# Patient Record
Sex: FEMALE | NOT HISPANIC OR LATINO | Employment: FULL TIME | URBAN - NONMETROPOLITAN AREA
[De-identification: names, ages, dates, MRNs, and addresses within clinical notes are randomized per-mention and may not be internally consistent; named-entity substitution may affect disease eponyms.]

---

## 2022-05-24 ENCOUNTER — OFFICE VISIT (OUTPATIENT)
Dept: FAMILY MEDICINE CLINIC | Facility: CLINIC | Age: 22
End: 2022-05-24

## 2022-05-24 VITALS — SYSTOLIC BLOOD PRESSURE: 118 MMHG | DIASTOLIC BLOOD PRESSURE: 72 MMHG | HEART RATE: 76 BPM

## 2022-05-24 DIAGNOSIS — Z20.9: Primary | ICD-10-CM

## 2022-05-24 PROCEDURE — 99212 OFFICE O/P EST SF 10 MIN: CPT | Performed by: NURSE PRACTITIONER

## 2022-05-24 PROCEDURE — 36415 COLL VENOUS BLD VENIPUNCTURE: CPT | Performed by: NURSE PRACTITIONER

## 2022-05-24 NOTE — PROGRESS NOTES
Chief Complaint  Puncture Wound (DOI 5/23/22.  This is a work injury from [facility].  She comes in with a stick from a dirty bur in her R hand.  Is vaccinated against Hep B.)    Subjective          Jadyn Escalera presents to [facility]  History of Present Illness puncture wound of the right hand yesterday.  This is to the proximal right 1st finger.  She has no personal hx of HIV or hepatitis B or C.    Objective   Vital Signs:  /72 (BP Location: Left arm, Patient Position: Sitting, Cuff Size: Adult)   Pulse 76   BMI has not been calculated during today's encounter.       Physical Exam  Pulmonary:      Effort: Pulmonary effort is normal.   Skin:     General: Skin is warm and dry.      Comments: Puncture wound to the palm side of the right proximal index finger.  Slight red with no drainage.    Neurological:      Mental Status: She is alert and oriented to person, place, and time.        Result Review :                 Assessment and Plan    Diagnoses and all orders for this visit:    1. Contact w and exposure to unsp communicable disease (Primary)  Assessment & Plan:  She is to f/u in one month and in six months for bw.  She is to keep this site clean and dry. F/u for any signs and symptoms of infections. Back to work w/o restrictions.             Follow Up   Return in about 4 weeks (around 6/21/2022), or she will need repeat bw., for Recheck.  Patient was given instructions and counseling regarding her condition or for health maintenance advice. Please see specific information pulled into the AVS if appropriate.

## 2022-05-24 NOTE — ASSESSMENT & PLAN NOTE
She is to f/u in one month and in six months for bw.  She is to keep this site clean and dry. F/u for any signs and symptoms of infections. Back to work w/o restrictions.

## 2022-05-25 LAB
HAV IGM SERPL QL IA: NEGATIVE
HBV CORE IGM SERPL QL IA: NEGATIVE
HBV SURFACE AG SERPL QL IA: NEGATIVE
HCV AB S/CO SERPL IA: 0.1 S/CO RATIO (ref 0–0.9)
HCV AB SERPL QL IA: NORMAL
HIV 1+2 AB+HIV1 P24 AG SERPL QL IA: NON REACTIVE

## 2022-07-26 ENCOUNTER — OFFICE VISIT (OUTPATIENT)
Dept: FAMILY MEDICINE CLINIC | Facility: CLINIC | Age: 22
End: 2022-07-26

## 2022-07-26 VITALS
SYSTOLIC BLOOD PRESSURE: 134 MMHG | OXYGEN SATURATION: 99 % | DIASTOLIC BLOOD PRESSURE: 96 MMHG | BODY MASS INDEX: 29.28 KG/M2 | HEART RATE: 97 BPM | WEIGHT: 159.1 LBS | TEMPERATURE: 98.6 F | HEIGHT: 62 IN

## 2022-07-26 DIAGNOSIS — Z20.9: Primary | ICD-10-CM

## 2022-07-26 PROCEDURE — 99213 OFFICE O/P EST LOW 20 MIN: CPT | Performed by: NURSE PRACTITIONER

## 2022-07-26 PROCEDURE — 36415 COLL VENOUS BLD VENIPUNCTURE: CPT | Performed by: NURSE PRACTITIONER

## 2022-07-26 RX ORDER — MULTIPLE VITAMINS W/ MINERALS TAB 9MG-400MCG
1 TAB ORAL DAILY
COMMUNITY

## 2022-07-26 NOTE — PROGRESS NOTES
"Chief Complaint  Body Fluid Exposure (Needle stick f/u)    Subjective        Jadyn Escalera presents to [facility]  History of Present Illness she was stuck by a dirty needle last month while at work. She is here today for her second bw.She has not had any symptoms of disease.    Objective   Vital Signs:  /96 (BP Location: Left arm, Patient Position: Sitting, Cuff Size: Adult)   Pulse 97   Temp 98.6 °F (37 °C) (Temporal)   Ht 157.5 cm (62\")   Wt 72.2 kg (159 lb 1.6 oz)   SpO2 99%   BMI 29.10 kg/m²   Estimated body mass index is 29.1 kg/m² as calculated from the following:    Height as of this encounter: 157.5 cm (62\").    Weight as of this encounter: 72.2 kg (159 lb 1.6 oz).          Physical Exam  Vitals and nursing note reviewed.   Constitutional:       Appearance: Normal appearance.   HENT:      Head: Normocephalic and atraumatic.      Right Ear: Tympanic membrane and ear canal normal.      Left Ear: Tympanic membrane and ear canal normal.      Nose: Nose normal.      Mouth/Throat:      Pharynx: Oropharynx is clear.   Eyes:      Extraocular Movements: Extraocular movements intact.      Conjunctiva/sclera: Conjunctivae normal.      Pupils: Pupils are equal, round, and reactive to light.   Cardiovascular:      Rate and Rhythm: Normal rate and regular rhythm.      Heart sounds: Normal heart sounds.   Pulmonary:      Effort: Pulmonary effort is normal.      Breath sounds: Normal breath sounds.   Abdominal:      General: Abdomen is flat. Bowel sounds are normal. There is no distension.      Palpations: Abdomen is soft.      Tenderness: There is no abdominal tenderness. There is no guarding or rebound.   Musculoskeletal:         General: Normal range of motion.      Cervical back: Normal range of motion and neck supple.   Skin:     General: Skin is warm and dry.   Neurological:      General: No focal deficit present.      Mental Status: She is alert and oriented to " person, place, and time. Mental status is at baseline.   Psychiatric:         Mood and Affect: Mood normal.         Behavior: Behavior normal.         Thought Content: Thought content normal.         Judgment: Judgment normal.        Result Review :                Assessment and Plan   Diagnoses and all orders for this visit:    1. [facility] (Primary)  Assessment & Plan:  Follow up in 4 months for a recheck. Back to work without restrictions.              Follow Up   Return in about 4 months (around 11/26/2022) for Recheck.  Patient was given instructions and counseling regarding her condition or for health maintenance advice. Please see specific information pulled into the AVS if appropriate.

## 2022-07-26 NOTE — PROGRESS NOTES
"Chief Complaint  Body Fluid Exposure (Needle stick f/u)    Subjective        Jadyn Escalera presents to [facility]  History of Present Illness she has not had any illness or problems since her last visit.     Objective   Vital Signs:  /96 (BP Location: Left arm, Patient Position: Sitting, Cuff Size: Adult)   Pulse 97   Temp 98.6 °F (37 °C) (Temporal)   Ht 157.5 cm (62\")   Wt 72.2 kg (159 lb 1.6 oz)   SpO2 99%   BMI 29.10 kg/m²   Estimated body mass index is 29.1 kg/m² as calculated from the following:    Height as of this encounter: 157.5 cm (62\").    Weight as of this encounter: 72.2 kg (159 lb 1.6 oz).          Physical Exam  Constitutional:       Appearance: Normal appearance.   HENT:      Right Ear: Tympanic membrane normal.      Left Ear: Tympanic membrane normal.   Cardiovascular:      Rate and Rhythm: Normal rate and regular rhythm.   Pulmonary:      Effort: Pulmonary effort is normal.      Breath sounds: Normal breath sounds.   Skin:     General: Skin is warm and dry.   Neurological:      Mental Status: She is alert and oriented to person, place, and time.   Psychiatric:         Behavior: Behavior normal.        Result Review :                Assessment and Plan   Diagnoses and all orders for this visit:    1. Contact w and exposure to unsp communicable disease (Primary)  Assessment & Plan:  Follow up in 4 months for a recheck. Back to work without restrictions.              Follow Up   Return in about 4 months (around 11/26/2022) for Recheck.  Patient was given instructions and counseling regarding her condition or for health maintenance advice. Please see specific information pulled into the AVS if appropriate.       "

## 2022-11-22 ENCOUNTER — OFFICE VISIT (OUTPATIENT)
Dept: FAMILY MEDICINE CLINIC | Facility: CLINIC | Age: 22
End: 2022-11-22

## 2022-11-22 VITALS
HEART RATE: 88 BPM | HEIGHT: 63 IN | BODY MASS INDEX: 28.21 KG/M2 | DIASTOLIC BLOOD PRESSURE: 84 MMHG | TEMPERATURE: 98 F | WEIGHT: 159.2 LBS | OXYGEN SATURATION: 99 % | SYSTOLIC BLOOD PRESSURE: 138 MMHG

## 2022-11-22 DIAGNOSIS — R61 HYPERHIDROSIS: ICD-10-CM

## 2022-11-22 DIAGNOSIS — R03.0 ELEVATED BP WITHOUT DIAGNOSIS OF HYPERTENSION: ICD-10-CM

## 2022-11-22 DIAGNOSIS — R63.5 WEIGHT GAIN: ICD-10-CM

## 2022-11-22 DIAGNOSIS — F41.1 GAD (GENERALIZED ANXIETY DISORDER): Primary | ICD-10-CM

## 2022-11-22 PROCEDURE — 99214 OFFICE O/P EST MOD 30 MIN: CPT | Performed by: FAMILY MEDICINE

## 2022-11-22 RX ORDER — GLYCOPYRROLATE 2 MG/1
2 TABLET ORAL 2 TIMES DAILY
Qty: 60 TABLET | Refills: 2 | Status: SHIPPED | OUTPATIENT
Start: 2022-11-22 | End: 2022-12-13 | Stop reason: SDUPTHER

## 2022-11-22 RX ORDER — SERTRALINE HYDROCHLORIDE 100 MG/1
100 TABLET, FILM COATED ORAL DAILY
Qty: 90 TABLET | Refills: 1 | Status: SHIPPED | OUTPATIENT
Start: 2022-11-22 | End: 2023-03-07 | Stop reason: SDUPTHER

## 2022-11-22 NOTE — PROGRESS NOTES
Date: 2022   Patient Name: Jadyn Escalera  : 2000   MRN: [id number]     Chief Complaint:    Chief Complaint   Patient presents with   • Anxiety     Discuss medication options    • Thyroid Problem     Complains of weight gain, hormone changes        History of Present Illness: Jadyn Escalera is a 22 y.o. female who is here today to establish care.  Chronic conditions include DENZEL/MDD and Eczema.  She has been struggling with weight gain and difficulty losing weight.  She saw her gynecologist last year and thinks that her thyroid labs may have been slightly abnormal.  She has not had lab work since that time.  She does report hair loss as well but denies any family history of thyroid issues.    This patient is also wishing to discuss the options available to her regarding either increasing her current dose of Sertraline or exploring her options for trying a new medication for DENZEL/MDD control. While she feels that Sertraline works for her, she says that it worked much better when she first started taking it as compared to its efficacy now.  She struggles with symptoms of OCD which have also worsened recently.    She reports excessive sweating of her hands and feet and thinks that she may have hyperhidrosis.  She would like to discuss medication options.  She has tried topical preparations in the past without improvement.          Review of Systems:   Review of Systems   Constitutional: Positive for fatigue and unexpected weight gain. Negative for chills and fever.   Respiratory: Negative for cough and shortness of breath.    Cardiovascular: Negative for chest pain and palpitations.   Gastrointestinal: Negative for abdominal pain, constipation, diarrhea, nausea and vomiting.   Endocrine:        Excessive sweating, hair loss   Musculoskeletal: Negative for back pain and myalgias.   Neurological: Negative for dizziness and headache.   Psychiatric/Behavioral: Positive for sleep disturbance  "and stress. Negative for depressed mood. The patient is nervous/anxious.        Past Medical History:   Past Medical History:   Diagnosis Date   • Anxiety        Past Surgical History:   Past Surgical History:   Procedure Laterality Date   • WISDOM TOOTH EXTRACTION         Family History:   Family History   Problem Relation Age of Onset   • Hypertension Mother    • Hypertension Father    • Coronary artery disease Father    • Diabetes Maternal Grandmother    • Stroke Maternal Grandmother    • Lung cancer Other    • Breast cancer Other        Social History:   Social History     Socioeconomic History   • Marital status:    Tobacco Use   • Smoking status: Never   • Smokeless tobacco: Never   Vaping Use   • Vaping Use: Never used   Substance and Sexual Activity   • Alcohol use: Not Currently   • Drug use: Never   • Sexual activity: Yes     Partners: Male       Medications:     Current Outpatient Medications:   •  multivitamin with minerals tablet tablet, Take 1 tablet by mouth Daily., Disp: , Rfl:   •  glycopyrrolate (ROBINUL) 2 MG tablet, Take 1 tablet by mouth 2 (Two) Times a Day., Disp: 60 tablet, Rfl: 2  •  sertraline (Zoloft) 100 MG tablet, Take 1 tablet by mouth Daily., Disp: 90 tablet, Rfl: 1    Allergies:   Allergies   Allergen Reactions   • Morphine Shortness Of Breath   • Sulfa Antibiotics Hives       PHQ-2 Total Score: 0   PHQ-9 Total Score: 0       Physical Exam:  Vital Signs:   Vitals:    11/22/22 0827 11/22/22 0918   BP: 154/92 138/84   BP Location: Left arm Right arm   Patient Position: Sitting Sitting   Cuff Size: Adult Adult   Pulse: 88    Temp: 98 °F (36.7 °C)    TempSrc: Temporal    SpO2: 99%    Weight: 72.2 kg (159 lb 3.2 oz)    Height: 158.8 cm (62.5\")      Body mass index is 28.65 kg/m².     Physical Exam  Vitals and nursing note reviewed.   Constitutional:       Appearance: Normal appearance.   Cardiovascular:      Rate and Rhythm: Normal rate and regular rhythm.      Heart sounds: Normal " heart sounds. No murmur heard.  Pulmonary:      Effort: Pulmonary effort is normal.      Breath sounds: Normal breath sounds. No wheezing.   Abdominal:      General: Bowel sounds are normal.      Palpations: Abdomen is soft.   Skin:     General: Skin is warm.   Neurological:      Mental Status: She is alert and oriented to person, place, and time. Mental status is at baseline.   Psychiatric:         Mood and Affect: Mood normal.         Behavior: Behavior normal.           Assessment/Plan:   Diagnoses and all orders for this visit:    1. DENZEL (generalized anxiety disorder) (Primary)  Assessment & Plan:  Psychological condition is worsening.  Increase Zoloft to 100 mg daily  Psychological condition  will be reassessed In 3 weeks.    Orders:  -     sertraline (Zoloft) 100 MG tablet; Take 1 tablet by mouth Daily.  Dispense: 90 tablet; Refill: 1    2. Weight gain  Assessment & Plan:  Will obtain labs today to further assess and repeat thyroid levels    Orders:  -     CBC Auto Differential; Future  -     Comprehensive Metabolic Panel; Future  -     TSH; Future  -     T4, Free; Future  -     CBC Auto Differential  -     Comprehensive Metabolic Panel  -     TSH  -     T4, Free    3. Elevated BP without diagnosis of hypertension  Assessment & Plan:  Patient will keep ambulatory blood pressure log and bring it to her follow-up appointment      4. Hyperhidrosis  Assessment & Plan:  Rx Robinul 2 mg.  Patient will start with 1 tablet daily and may increase to 1 tablet twice daily.  Side effects discussed    Orders:  -     glycopyrrolate (ROBINUL) 2 MG tablet; Take 1 tablet by mouth 2 (Two) Times a Day.  Dispense: 60 tablet; Refill: 2         Follow Up:   Return in about 3 weeks (around 12/13/2022) for Med Recheck.    Candace Nicolas DO  [facility]

## 2022-11-22 NOTE — ASSESSMENT & PLAN NOTE
Psychological condition is worsening.  Increase Zoloft to 100 mg daily  Psychological condition  will be reassessed In 3 weeks.

## 2022-11-22 NOTE — ASSESSMENT & PLAN NOTE
Rx Robinul 2 mg.  Patient will start with 1 tablet daily and may increase to 1 tablet twice daily.  Side effects discussed

## 2022-11-23 LAB
ALBUMIN SERPL-MCNC: 4.8 G/DL (ref 3.9–5)
ALBUMIN/GLOB SERPL: 1.7 {RATIO} (ref 1.2–2.2)
ALP SERPL-CCNC: 81 IU/L (ref 44–121)
ALT SERPL-CCNC: 11 IU/L (ref 0–32)
AST SERPL-CCNC: 15 IU/L (ref 0–40)
BASOPHILS # BLD AUTO: 0.1 X10E3/UL (ref 0–0.2)
BASOPHILS NFR BLD AUTO: 1 %
BILIRUB SERPL-MCNC: 0.6 MG/DL (ref 0–1.2)
BUN SERPL-MCNC: 9 MG/DL (ref 6–20)
BUN/CREAT SERPL: 14 (ref 9–23)
CALCIUM SERPL-MCNC: 9.4 MG/DL (ref 8.7–10.2)
CHLORIDE SERPL-SCNC: 102 MMOL/L (ref 96–106)
CO2 SERPL-SCNC: 24 MMOL/L (ref 20–29)
CREAT SERPL-MCNC: 0.63 MG/DL (ref 0.57–1)
EGFRCR SERPLBLD CKD-EPI 2021: 127 ML/MIN/1.73
EOSINOPHIL # BLD AUTO: 0.1 X10E3/UL (ref 0–0.4)
EOSINOPHIL NFR BLD AUTO: 2 %
ERYTHROCYTE [DISTWIDTH] IN BLOOD BY AUTOMATED COUNT: 13.3 % (ref 11.7–15.4)
GLOBULIN SER CALC-MCNC: 2.9 G/DL (ref 1.5–4.5)
GLUCOSE SERPL-MCNC: 91 MG/DL (ref 70–99)
HCT VFR BLD AUTO: 37.5 % (ref 34–46.6)
HGB BLD-MCNC: 11.9 G/DL (ref 11.1–15.9)
IMM GRANULOCYTES # BLD AUTO: 0 X10E3/UL (ref 0–0.1)
IMM GRANULOCYTES NFR BLD AUTO: 1 %
LYMPHOCYTES # BLD AUTO: 2.8 X10E3/UL (ref 0.7–3.1)
LYMPHOCYTES NFR BLD AUTO: 39 %
MCH RBC QN AUTO: 27.1 PG (ref 26.6–33)
MCHC RBC AUTO-ENTMCNC: 31.7 G/DL (ref 31.5–35.7)
MCV RBC AUTO: 85 FL (ref 79–97)
MONOCYTES # BLD AUTO: 0.7 X10E3/UL (ref 0.1–0.9)
MONOCYTES NFR BLD AUTO: 9 %
NEUTROPHILS # BLD AUTO: 3.4 X10E3/UL (ref 1.4–7)
NEUTROPHILS NFR BLD AUTO: 48 %
PLATELET # BLD AUTO: 293 X10E3/UL (ref 150–450)
POTASSIUM SERPL-SCNC: 4.2 MMOL/L (ref 3.5–5.2)
PROT SERPL-MCNC: 7.7 G/DL (ref 6–8.5)
RBC # BLD AUTO: 4.39 X10E6/UL (ref 3.77–5.28)
SODIUM SERPL-SCNC: 140 MMOL/L (ref 134–144)
T4 FREE SERPL-MCNC: 1.15 NG/DL (ref 0.82–1.77)
TSH SERPL DL<=0.005 MIU/L-ACNC: 0.8 UIU/ML (ref 0.45–4.5)
WBC # BLD AUTO: 7 X10E3/UL (ref 3.4–10.8)

## 2022-12-13 ENCOUNTER — OFFICE VISIT (OUTPATIENT)
Dept: FAMILY MEDICINE CLINIC | Facility: CLINIC | Age: 22
End: 2022-12-13

## 2022-12-13 VITALS
HEIGHT: 63 IN | HEART RATE: 105 BPM | BODY MASS INDEX: 27.73 KG/M2 | WEIGHT: 156.5 LBS | OXYGEN SATURATION: 97 % | TEMPERATURE: 97.7 F | SYSTOLIC BLOOD PRESSURE: 138 MMHG | DIASTOLIC BLOOD PRESSURE: 86 MMHG

## 2022-12-13 DIAGNOSIS — F41.1 GAD (GENERALIZED ANXIETY DISORDER): ICD-10-CM

## 2022-12-13 DIAGNOSIS — E66.3 OVERWEIGHT: ICD-10-CM

## 2022-12-13 DIAGNOSIS — R61 HYPERHIDROSIS: Primary | ICD-10-CM

## 2022-12-13 DIAGNOSIS — I10 PRIMARY HYPERTENSION: ICD-10-CM

## 2022-12-13 PROCEDURE — 99214 OFFICE O/P EST MOD 30 MIN: CPT | Performed by: FAMILY MEDICINE

## 2022-12-13 RX ORDER — GLYCOPYRROLATE 2 MG/1
2 TABLET ORAL 2 TIMES DAILY
Qty: 180 TABLET | Refills: 1 | Status: SHIPPED | OUTPATIENT
Start: 2022-12-13 | End: 2023-03-07 | Stop reason: SDUPTHER

## 2022-12-13 NOTE — PROGRESS NOTES
Date: 2022   Patient Name: Jadyn Escalera  : 2000   MRN: [id number]     Chief Complaint:    Chief Complaint   Patient presents with   • Anxiety     Patient reports some improvement with Zoloft    • Hypertension     Blood pressure check        History of Present Illness: Jadyn Escalera is a 22 y.o. female who is here today to follow up for Anxiety, elevated blood pressure, and hyperhidrosis.  She reports anxiety is significantly improved after increase in Zoloft to 100 mg daily.  She denies side effects of the medication and feels comfortable staying at the current dose.    She reports home blood pressures have been elevated in the 130s over 80s to 90s.  She has not been on medication for this in the past.  She does still want to discuss options for weight loss.  All of her recent labs were normal.    Hyperhidrosis is improved on Robinul as long as she is taking regularly.  She denies side effects or excessive dryness.             Review of Systems:   Review of Systems   Constitutional: Negative for chills, fatigue and fever.   Respiratory: Negative for cough and shortness of breath.    Cardiovascular: Negative for chest pain and palpitations.   Gastrointestinal: Negative for abdominal pain, constipation, diarrhea, nausea and vomiting.   Musculoskeletal: Negative for back pain and myalgias.   Neurological: Negative for dizziness and headache.   Psychiatric/Behavioral: Negative for depressed mood. The patient is not nervous/anxious.        Past Medical History:   Past Medical History:   Diagnosis Date   • Anxiety        Past Surgical History:   Past Surgical History:   Procedure Laterality Date   • WISDOM TOOTH EXTRACTION         Family History:   Family History   Problem Relation Age of Onset   • Hypertension Mother    • Hypertension Father    • Coronary artery disease Father    • Diabetes Maternal Grandmother    • Stroke Maternal Grandmother    • Lung cancer Other    • Breast  "cancer Other        Social History:   Social History     Socioeconomic History   • Marital status:    Tobacco Use   • Smoking status: Never   • Smokeless tobacco: Never   Vaping Use   • Vaping Use: Never used   Substance and Sexual Activity   • Alcohol use: Not Currently   • Drug use: Never   • Sexual activity: Yes     Partners: Male       Medications:     Current Outpatient Medications:   •  glycopyrrolate (ROBINUL) 2 MG tablet, Take 1 tablet by mouth 2 (Two) Times a Day., Disp: 180 tablet, Rfl: 1  •  multivitamin with minerals tablet tablet, Take 1 tablet by mouth Daily., Disp: , Rfl:   •  sertraline (Zoloft) 100 MG tablet, Take 1 tablet by mouth Daily., Disp: 90 tablet, Rfl: 1    Allergies:   Allergies   Allergen Reactions   • Morphine Shortness Of Breath   • Sulfa Antibiotics Hives       PHQ-2 Total Score:     PHQ-9 Total Score:       Physical Exam:  Vital Signs:   Vitals:    12/13/22 1008   BP: 138/86   BP Location: Left arm   Patient Position: Sitting   Cuff Size: Adult   Pulse: 105   Temp: 97.7 °F (36.5 °C)   TempSrc: Temporal   SpO2: 97%   Weight: 71 kg (156 lb 8 oz)   Height: 158.8 cm (62.5\")     Body mass index is 28.17 kg/m².     Physical Exam  Vitals and nursing note reviewed.   Constitutional:       Appearance: Normal appearance.   Cardiovascular:      Rate and Rhythm: Normal rate and regular rhythm.      Heart sounds: Normal heart sounds. No murmur heard.  Pulmonary:      Effort: Pulmonary effort is normal.      Breath sounds: Normal breath sounds. No wheezing.   Abdominal:      General: Bowel sounds are normal.      Palpations: Abdomen is soft.   Skin:     General: Skin is warm.   Neurological:      Mental Status: She is alert and oriented to person, place, and time. Mental status is at baseline.   Psychiatric:         Mood and Affect: Mood normal.         Behavior: Behavior normal.           Assessment/Plan:   Diagnoses and all orders for this visit:    1. Hyperhidrosis (Primary)  Assessment & " Plan:  Improving on Robinul, patient will continue daily dosing    Orders:  -     glycopyrrolate (ROBINUL) 2 MG tablet; Take 1 tablet by mouth 2 (Two) Times a Day.  Dispense: 180 tablet; Refill: 1    2. Primary hypertension  Assessment & Plan:  Hypertension is newly identified.  Weight loss.  Ambulatory blood pressure monitoring.  We will hold off on medication treatment if we are able to get Saxenda approved for weight loss I am confident this will help with blood pressures  Blood pressure will be reassessed in 3 months.      3. DENZEL (generalized anxiety disorder)  Assessment & Plan:  Psychological condition is improving with treatment.  Continue current treatment regimen.  Psychological condition  will be reassessed in 3 months.      4. Overweight  Assessment & Plan:  Patient's (Body mass index is 28.17 kg/m².) indicates that they are overweight with health conditions that include hypertension . Weight is worsening. BMI is is above average; BMI management plan is completed. We discussed portion control, increasing exercise and pharmacologic options including Saxenda.            Follow Up:   Return in about 3 months (around 3/13/2023) for Med Recheck.    Candace Nicolas, DO  [facility]

## 2022-12-13 NOTE — ASSESSMENT & PLAN NOTE
Hypertension is newly identified.  Weight loss.  Ambulatory blood pressure monitoring.  We will hold off on medication treatment if we are able to get Saxenda approved for weight loss I am confident this will help with blood pressures  Blood pressure will be reassessed in 3 months.

## 2022-12-13 NOTE — ASSESSMENT & PLAN NOTE
Patient's (Body mass index is 28.17 kg/m².) indicates that they are overweight with health conditions that include hypertension . Weight is worsening. BMI is is above average; BMI management plan is completed. We discussed portion control, increasing exercise and pharmacologic options including Saxenda.

## 2023-03-07 ENCOUNTER — OFFICE VISIT (OUTPATIENT)
Dept: FAMILY MEDICINE CLINIC | Facility: CLINIC | Age: 23
End: 2023-03-07
Payer: COMMERCIAL

## 2023-03-07 VITALS
SYSTOLIC BLOOD PRESSURE: 128 MMHG | TEMPERATURE: 97.8 F | HEIGHT: 63 IN | OXYGEN SATURATION: 98 % | BODY MASS INDEX: 25.82 KG/M2 | WEIGHT: 145.7 LBS | HEART RATE: 97 BPM | DIASTOLIC BLOOD PRESSURE: 82 MMHG

## 2023-03-07 DIAGNOSIS — R61 HYPERHIDROSIS: ICD-10-CM

## 2023-03-07 DIAGNOSIS — E66.3 OVERWEIGHT: ICD-10-CM

## 2023-03-07 DIAGNOSIS — F41.1 GAD (GENERALIZED ANXIETY DISORDER): Primary | ICD-10-CM

## 2023-03-07 PROCEDURE — 99213 OFFICE O/P EST LOW 20 MIN: CPT | Performed by: FAMILY MEDICINE

## 2023-03-07 RX ORDER — SERTRALINE HYDROCHLORIDE 100 MG/1
150 TABLET, FILM COATED ORAL DAILY
Qty: 135 TABLET | Refills: 1 | Status: SHIPPED | OUTPATIENT
Start: 2023-03-07

## 2023-03-07 RX ORDER — GLYCOPYRROLATE 2 MG/1
2 TABLET ORAL 2 TIMES DAILY
Qty: 180 TABLET | Refills: 1 | Status: SHIPPED | OUTPATIENT
Start: 2023-03-07

## 2023-03-07 NOTE — PROGRESS NOTES
Date: 2023   Patient Name: Jadyn Escalera  : 2000   MRN: [id number]     Chief Complaint:    Chief Complaint   Patient presents with   • Excessive Sweating   • Anxiety       History of Present Illness: Jadyn Escalera is a 22 y.o. female who is here today for Anxiety and hyperhidrosis.  Excessive sweating is still well controlled on Robinul.  She did have some drying of her hands so she has decreased the dose a little to help with side effects.    Anxiety is significantly improved from her initial visit.  She would still like to see a little bit more improvement in her OCD symptoms so she inquires about potentially increasing the dose.           Review of Systems:   Review of Systems   Constitutional: Negative for chills, fatigue and fever.   Respiratory: Negative for cough and shortness of breath.    Cardiovascular: Negative for chest pain and palpitations.   Gastrointestinal: Negative for abdominal pain, blood in stool, constipation, diarrhea, nausea and vomiting.   Musculoskeletal: Negative for back pain and myalgias.   Neurological: Negative for dizziness and headache.   Psychiatric/Behavioral: Negative for depressed mood. The patient is nervous/anxious.        Past Medical History:   Past Medical History:   Diagnosis Date   • Anxiety        Past Surgical History:   Past Surgical History:   Procedure Laterality Date   • WISDOM TOOTH EXTRACTION         Family History:   Family History   Problem Relation Age of Onset   • Hypertension Mother    • Hypertension Father    • Coronary artery disease Father    • Diabetes Maternal Grandmother    • Stroke Maternal Grandmother    • Lung cancer Other    • Breast cancer Other        Social History:   Social History     Socioeconomic History   • Marital status:    Tobacco Use   • Smoking status: Never   • Smokeless tobacco: Never   Vaping Use   • Vaping Use: Never used   Substance and Sexual Activity   • Alcohol use: Not Currently   •  "Drug use: Never   • Sexual activity: Yes     Partners: Male       Medications:     Current Outpatient Medications:   •  glycopyrrolate (ROBINUL) 2 MG tablet, Take 1 tablet by mouth 2 (Two) Times a Day., Disp: 180 tablet, Rfl: 1  •  multivitamin with minerals tablet tablet, Take 1 tablet by mouth Daily., Disp: , Rfl:   •  sertraline (Zoloft) 100 MG tablet, Take 1.5 tablets by mouth Daily., Disp: 135 tablet, Rfl: 1    Allergies:   Allergies   Allergen Reactions   • Morphine Shortness Of Breath   • Sulfa Antibiotics Hives         Physical Exam:  Vital Signs:   Vitals:    03/07/23 0927   BP: 128/82   Pulse: 97   Temp: 97.8 °F (36.6 °C)   TempSrc: Temporal   SpO2: 98%   Weight: 66.1 kg (145 lb 11.2 oz)   Height: 158.8 cm (62.52\")  Comment: pt reported     Body mass index is 26.21 kg/m².     Physical Exam  Vitals and nursing note reviewed.   Constitutional:       Appearance: Normal appearance.   Cardiovascular:      Rate and Rhythm: Normal rate and regular rhythm.      Heart sounds: Normal heart sounds. No murmur heard.  Pulmonary:      Effort: Pulmonary effort is normal.      Breath sounds: Normal breath sounds. No wheezing.   Neurological:      Mental Status: She is alert and oriented to person, place, and time. Mental status is at baseline.   Psychiatric:         Mood and Affect: Mood normal.         Behavior: Behavior normal.           Assessment/Plan:   Diagnoses and all orders for this visit:    1. DENZEL (generalized anxiety disorder) (Primary)  Assessment & Plan:  Psychological condition is improving with treatment.  Increase Zoloft to 150 mg daily  Psychological condition  will be reassessed at the next regular appointment.    Orders:  -     sertraline (Zoloft) 100 MG tablet; Take 1.5 tablets by mouth Daily.  Dispense: 135 tablet; Refill: 1    2. Hyperhidrosis  Assessment & Plan:  Improving on Robinul, patient will continue daily dosing    Orders:  -     glycopyrrolate (ROBINUL) 2 MG tablet; Take 1 tablet by mouth 2 " (Two) Times a Day.  Dispense: 180 tablet; Refill: 1    3. Overweight  Assessment & Plan:  Patient's (Body mass index is 26.21 kg/m².) indicates that they are overweight with health conditions that include hypertension . Weight is worsening. BMI is is above average; BMI management plan is completed. We discussed portion control, increasing exercise and pharmacologic options including Saxenda.            Follow Up:   Return in about 6 months (around 9/7/2023) for Med Recheck.    Candace Nicolas, DO  [facility]

## 2023-03-07 NOTE — ASSESSMENT & PLAN NOTE
Patient's (Body mass index is 26.21 kg/m².) indicates that they are overweight with health conditions that include hypertension . Weight is worsening. BMI is is above average; BMI management plan is completed. We discussed portion control, increasing exercise and pharmacologic options including Saxenda.

## 2023-03-07 NOTE — ASSESSMENT & PLAN NOTE
Psychological condition is improving with treatment.  Increase Zoloft to 150 mg daily  Psychological condition  will be reassessed at the next regular appointment.

## 2023-09-18 ENCOUNTER — OFFICE VISIT (OUTPATIENT)
Dept: FAMILY MEDICINE CLINIC | Facility: CLINIC | Age: 23
End: 2023-09-18
Payer: COMMERCIAL

## 2023-09-18 VITALS
SYSTOLIC BLOOD PRESSURE: 136 MMHG | HEIGHT: 62 IN | TEMPERATURE: 97.3 F | WEIGHT: 169.2 LBS | DIASTOLIC BLOOD PRESSURE: 100 MMHG | BODY MASS INDEX: 31.14 KG/M2 | OXYGEN SATURATION: 99 % | HEART RATE: 133 BPM

## 2023-09-18 DIAGNOSIS — U07.1 COVID-19 VIRUS DETECTED: Primary | ICD-10-CM

## 2023-09-18 LAB
EXPIRATION DATE: ABNORMAL
EXPIRATION DATE: NORMAL
FLUAV AG UPPER RESP QL IA.RAPID: NOT DETECTED
FLUBV AG UPPER RESP QL IA.RAPID: NOT DETECTED
INTERNAL CONTROL: ABNORMAL
INTERNAL CONTROL: NORMAL
Lab: ABNORMAL
Lab: NORMAL
S PYO AG THROAT QL: NEGATIVE
SARS-COV-2 AG UPPER RESP QL IA.RAPID: DETECTED

## 2023-09-18 PROCEDURE — 87428 SARSCOV & INF VIR A&B AG IA: CPT | Performed by: FAMILY MEDICINE

## 2023-09-18 PROCEDURE — 99213 OFFICE O/P EST LOW 20 MIN: CPT | Performed by: FAMILY MEDICINE

## 2023-09-18 PROCEDURE — 87880 STREP A ASSAY W/OPTIC: CPT | Performed by: FAMILY MEDICINE

## 2023-09-18 RX ORDER — PNV NO.95/FERROUS FUM/FOLIC AC 28MG-0.8MG
1 TABLET ORAL DAILY
COMMUNITY

## 2023-09-18 RX ORDER — FERROUS SULFATE 325(65) MG
325 TABLET ORAL
COMMUNITY

## 2023-09-18 NOTE — PROGRESS NOTES
Date: 2023   Patient Name: Jadyn Escalera  : 2000   MRN: [id number]     Chief Complaint:    Chief Complaint   Patient presents with    Sore Throat     Headache, muscle aches        History of Present Illness: Jadyn Escalera is a 22 y.o. female who is here today for Headache, body aches, and sore throat that started yesterday. She is currently pregnant 30 weeks gestation, no issues during pregnancy thus far.           Review of Systems:   Review of Systems   Constitutional:  Positive for activity change, chills and fatigue. Negative for fever.   HENT:  Positive for congestion, sinus pressure and sore throat.    Respiratory:  Negative for cough and shortness of breath.    Cardiovascular:  Negative for chest pain and palpitations.   Gastrointestinal:  Negative for abdominal pain, constipation, diarrhea, nausea and vomiting.   Musculoskeletal:  Positive for myalgias. Negative for back pain.   Neurological:  Negative for dizziness and headache.   Psychiatric/Behavioral:  Negative for depressed mood. The patient is not nervous/anxious.      Past Medical History:   Past Medical History:   Diagnosis Date    Anxiety        Past Surgical History:   Past Surgical History:   Procedure Laterality Date    WISDOM TOOTH EXTRACTION         Family History:   Family History   Problem Relation Age of Onset    Hypertension Mother     Hypertension Father     Coronary artery disease Father     Diabetes Maternal Grandmother     Stroke Maternal Grandmother     Lung cancer Other     Breast cancer Other        Social History:   Social History     Socioeconomic History    Marital status:    Tobacco Use    Smoking status: Never    Smokeless tobacco: Never   Vaping Use    Vaping Use: Never used   Substance and Sexual Activity    Alcohol use: Not Currently    Drug use: Never    Sexual activity: Yes     Partners: Male       Medications:     Current Outpatient Medications:     ferrous sulfate 325 (65 FE)  "MG tablet, Take 1 tablet by mouth Daily With Breakfast., Disp: , Rfl:     Prenatal Vit-Fe Fumarate-FA (Prenatal Vitamin) 27-0.8 MG tablet, Take 1 tablet by mouth Daily., Disp: , Rfl:     Allergies:   Allergies   Allergen Reactions    Morphine Shortness Of Breath    Sulfa Antibiotics Hives         Physical Exam:  Vital Signs:   Vitals:    09/18/23 1105   BP: 136/100   BP Location: Left arm   Patient Position: Sitting   Cuff Size: Adult   Pulse: (!) 133   Temp: 97.3 °F (36.3 °C)   TempSrc: Temporal   SpO2: 99%   Weight: 76.7 kg (169 lb 3.2 oz)   Height: 157.5 cm (62\")     Body mass index is 30.95 kg/m².     Physical Exam  Vitals and nursing note reviewed.   Constitutional:       Appearance: She is ill-appearing.   HENT:      Head: Normocephalic.   Cardiovascular:      Rate and Rhythm: Regular rhythm. Tachycardia present.      Heart sounds: No murmur heard.  Pulmonary:      Effort: Pulmonary effort is normal. No respiratory distress.      Breath sounds: Normal breath sounds. No wheezing.   Abdominal:      Comments: Gravid   Neurological:      Mental Status: She is alert and oriented to person, place, and time.   Psychiatric:         Mood and Affect: Mood normal.         Behavior: Behavior normal.         Assessment/Plan:   Diagnoses and all orders for this visit:    1. COVID-19 virus detected (Primary)  Assessment & Plan:  We decided against Paxlovid due to drug allergies. She will treat symptomatically at home. She was given information on which cough/cold medications she can use. May use Tylenol as needed for fever or myalgias. She was instructed to call or go to the ED should symptoms acutely worsen.             Follow Up:   Return if symptoms worsen or fail to improve.    Candace Nicolas, DO  [facility]    "

## 2023-09-18 NOTE — ASSESSMENT & PLAN NOTE
We decided against Paxlovid due to drug allergies. She will treat symptomatically at home. She was given information on which cough/cold medications she can use. May use Tylenol as needed for fever or myalgias. She was instructed to call or go to the ED should symptoms acutely worsen.

## 2023-09-22 ENCOUNTER — TELEPHONE (OUTPATIENT)
Dept: FAMILY MEDICINE CLINIC | Facility: CLINIC | Age: 23
End: 2023-09-22